# Patient Record
Sex: FEMALE | Race: BLACK OR AFRICAN AMERICAN | NOT HISPANIC OR LATINO | Employment: OTHER | URBAN - METROPOLITAN AREA
[De-identification: names, ages, dates, MRNs, and addresses within clinical notes are randomized per-mention and may not be internally consistent; named-entity substitution may affect disease eponyms.]

---

## 2020-06-05 ENCOUNTER — HOSPITAL ENCOUNTER (EMERGENCY)
Facility: CLINIC | Age: 85
Discharge: HOME | End: 2020-06-05
Attending: EMERGENCY MEDICINE | Admitting: EMERGENCY MEDICINE
Payer: COMMERCIAL

## 2020-06-05 ENCOUNTER — APPOINTMENT (OUTPATIENT)
Dept: GENERAL RADIOLOGY | Facility: CLINIC | Age: 85
End: 2020-06-05
Attending: EMERGENCY MEDICINE
Payer: COMMERCIAL

## 2020-06-05 ENCOUNTER — APPOINTMENT (OUTPATIENT)
Dept: CT IMAGING | Facility: CLINIC | Age: 85
End: 2020-06-05
Attending: EMERGENCY MEDICINE
Payer: COMMERCIAL

## 2020-06-05 VITALS
HEART RATE: 59 BPM | SYSTOLIC BLOOD PRESSURE: 129 MMHG | RESPIRATION RATE: 20 BRPM | OXYGEN SATURATION: 100 % | TEMPERATURE: 98 F | DIASTOLIC BLOOD PRESSURE: 69 MMHG

## 2020-06-05 DIAGNOSIS — J44.1 COPD EXACERBATION (H): ICD-10-CM

## 2020-06-05 DIAGNOSIS — R06.02 SHORTNESS OF BREATH: ICD-10-CM

## 2020-06-05 DIAGNOSIS — I44.7 LBBB (LEFT BUNDLE BRANCH BLOCK): ICD-10-CM

## 2020-06-05 LAB
ALBUMIN SERPL-MCNC: 3.4 G/DL (ref 3.4–5)
ALBUMIN UR-MCNC: NEGATIVE MG/DL
ALP SERPL-CCNC: 97 U/L (ref 40–150)
ALT SERPL W P-5'-P-CCNC: 20 U/L (ref 0–50)
ANION GAP SERPL CALCULATED.3IONS-SCNC: 5 MMOL/L (ref 3–14)
APPEARANCE UR: CLEAR
AST SERPL W P-5'-P-CCNC: 23 U/L (ref 0–45)
BASOPHILS # BLD AUTO: 0.1 10E9/L (ref 0–0.2)
BASOPHILS NFR BLD AUTO: 1.2 %
BILIRUB SERPL-MCNC: 0.8 MG/DL (ref 0.2–1.3)
BILIRUB UR QL STRIP: NEGATIVE
BUN SERPL-MCNC: 20 MG/DL (ref 7–30)
CALCIUM SERPL-MCNC: 10 MG/DL (ref 8.5–10.1)
CHLORIDE SERPL-SCNC: 107 MMOL/L (ref 94–109)
CO2 SERPL-SCNC: 25 MMOL/L (ref 20–32)
COLOR UR AUTO: NORMAL
CREAT SERPL-MCNC: 0.85 MG/DL (ref 0.52–1.04)
D DIMER PPP FEU-MCNC: 1.3 UG/ML FEU (ref 0–0.5)
DIFFERENTIAL METHOD BLD: ABNORMAL
EOSINOPHIL # BLD AUTO: 0.4 10E9/L (ref 0–0.7)
EOSINOPHIL NFR BLD AUTO: 5.6 %
ERYTHROCYTE [DISTWIDTH] IN BLOOD BY AUTOMATED COUNT: 16.3 % (ref 10–15)
GFR SERPL CREATININE-BSD FRML MDRD: 62 ML/MIN/{1.73_M2}
GLUCOSE SERPL-MCNC: 109 MG/DL (ref 70–99)
GLUCOSE UR STRIP-MCNC: NEGATIVE MG/DL
HCT VFR BLD AUTO: 42 % (ref 35–47)
HGB BLD-MCNC: 12.8 G/DL (ref 11.7–15.7)
HGB UR QL STRIP: NEGATIVE
IMM GRANULOCYTES # BLD: 0 10E9/L (ref 0–0.4)
IMM GRANULOCYTES NFR BLD: 0.2 %
KETONES UR STRIP-MCNC: NEGATIVE MG/DL
LACTATE BLD-SCNC: 0.7 MMOL/L (ref 0.7–2)
LEUKOCYTE ESTERASE UR QL STRIP: NEGATIVE
LIPASE SERPL-CCNC: 53 U/L (ref 73–393)
LYMPHOCYTES # BLD AUTO: 2.5 10E9/L (ref 0.8–5.3)
LYMPHOCYTES NFR BLD AUTO: 38.9 %
MCH RBC QN AUTO: 28.8 PG (ref 26.5–33)
MCHC RBC AUTO-ENTMCNC: 30.5 G/DL (ref 31.5–36.5)
MCV RBC AUTO: 94 FL (ref 78–100)
MONOCYTES # BLD AUTO: 0.7 10E9/L (ref 0–1.3)
MONOCYTES NFR BLD AUTO: 10.9 %
NEUTROPHILS # BLD AUTO: 2.8 10E9/L (ref 1.6–8.3)
NEUTROPHILS NFR BLD AUTO: 43.2 %
NITRATE UR QL: NEGATIVE
NRBC # BLD AUTO: 0 10*3/UL
NRBC BLD AUTO-RTO: 0 /100
PH UR STRIP: 5.5 PH (ref 5–7)
PLATELET # BLD AUTO: 215 10E9/L (ref 150–450)
POTASSIUM SERPL-SCNC: 4 MMOL/L (ref 3.4–5.3)
PROT SERPL-MCNC: 7.9 G/DL (ref 6.8–8.8)
RBC # BLD AUTO: 4.45 10E12/L (ref 3.8–5.2)
RBC #/AREA URNS AUTO: 1 /HPF (ref 0–2)
SODIUM SERPL-SCNC: 137 MMOL/L (ref 133–144)
SOURCE: NORMAL
SP GR UR STRIP: 1.01 (ref 1–1.03)
SQUAMOUS #/AREA URNS AUTO: 1 /HPF (ref 0–1)
TROPONIN I SERPL-MCNC: <0.015 UG/L (ref 0–0.04)
UROBILINOGEN UR STRIP-MCNC: NORMAL MG/DL (ref 0–2)
WBC # BLD AUTO: 6.5 10E9/L (ref 4–11)
WBC #/AREA URNS AUTO: 1 /HPF (ref 0–5)

## 2020-06-05 PROCEDURE — 36415 COLL VENOUS BLD VENIPUNCTURE: CPT | Performed by: EMERGENCY MEDICINE

## 2020-06-05 PROCEDURE — 81001 URINALYSIS AUTO W/SCOPE: CPT | Performed by: EMERGENCY MEDICINE

## 2020-06-05 PROCEDURE — 99285 EMERGENCY DEPT VISIT HI MDM: CPT | Mod: 25

## 2020-06-05 PROCEDURE — 84484 ASSAY OF TROPONIN QUANT: CPT | Performed by: EMERGENCY MEDICINE

## 2020-06-05 PROCEDURE — 80053 COMPREHEN METABOLIC PANEL: CPT | Performed by: EMERGENCY MEDICINE

## 2020-06-05 PROCEDURE — 96365 THER/PROPH/DIAG IV INF INIT: CPT | Mod: 59

## 2020-06-05 PROCEDURE — 25000132 ZZH RX MED GY IP 250 OP 250 PS 637: Performed by: EMERGENCY MEDICINE

## 2020-06-05 PROCEDURE — 96366 THER/PROPH/DIAG IV INF ADDON: CPT

## 2020-06-05 PROCEDURE — 25000125 ZZHC RX 250: Performed by: EMERGENCY MEDICINE

## 2020-06-05 PROCEDURE — 71045 X-RAY EXAM CHEST 1 VIEW: CPT

## 2020-06-05 PROCEDURE — 25000128 H RX IP 250 OP 636: Performed by: EMERGENCY MEDICINE

## 2020-06-05 PROCEDURE — 71275 CT ANGIOGRAPHY CHEST: CPT

## 2020-06-05 PROCEDURE — 96375 TX/PRO/DX INJ NEW DRUG ADDON: CPT | Mod: 59

## 2020-06-05 PROCEDURE — 94640 AIRWAY INHALATION TREATMENT: CPT

## 2020-06-05 PROCEDURE — 85379 FIBRIN DEGRADATION QUANT: CPT | Performed by: EMERGENCY MEDICINE

## 2020-06-05 PROCEDURE — 83690 ASSAY OF LIPASE: CPT | Performed by: EMERGENCY MEDICINE

## 2020-06-05 PROCEDURE — 93005 ELECTROCARDIOGRAM TRACING: CPT

## 2020-06-05 PROCEDURE — 87040 BLOOD CULTURE FOR BACTERIA: CPT | Performed by: EMERGENCY MEDICINE

## 2020-06-05 PROCEDURE — 85025 COMPLETE CBC W/AUTO DIFF WBC: CPT | Performed by: EMERGENCY MEDICINE

## 2020-06-05 PROCEDURE — 83605 ASSAY OF LACTIC ACID: CPT | Performed by: EMERGENCY MEDICINE

## 2020-06-05 RX ORDER — ALBUTEROL SULFATE 90 UG/1
2 AEROSOL, METERED RESPIRATORY (INHALATION)
Status: COMPLETED | OUTPATIENT
Start: 2020-06-05 | End: 2020-06-05

## 2020-06-05 RX ORDER — METHYLPREDNISOLONE SODIUM SUCCINATE 125 MG/2ML
60 INJECTION, POWDER, LYOPHILIZED, FOR SOLUTION INTRAMUSCULAR; INTRAVENOUS ONCE
Status: COMPLETED | OUTPATIENT
Start: 2020-06-05 | End: 2020-06-05

## 2020-06-05 RX ORDER — IOPAMIDOL 755 MG/ML
500 INJECTION, SOLUTION INTRAVASCULAR ONCE
Status: COMPLETED | OUTPATIENT
Start: 2020-06-05 | End: 2020-06-05

## 2020-06-05 RX ORDER — ALBUTEROL SULFATE 90 UG/1
6 AEROSOL, METERED RESPIRATORY (INHALATION) ONCE
Status: DISCONTINUED | OUTPATIENT
Start: 2020-06-05 | End: 2020-06-05

## 2020-06-05 RX ORDER — LIDOCAINE 40 MG/G
CREAM TOPICAL
Status: DISCONTINUED | OUTPATIENT
Start: 2020-06-05 | End: 2020-06-05 | Stop reason: HOSPADM

## 2020-06-05 RX ORDER — ALBUTEROL SULFATE 90 UG/1
2 AEROSOL, METERED RESPIRATORY (INHALATION) EVERY 6 HOURS PRN
Qty: 1 INHALER | Refills: 0 | Status: SHIPPED | OUTPATIENT
Start: 2020-06-05 | End: 2021-11-29

## 2020-06-05 RX ORDER — PREDNISONE 20 MG/1
TABLET ORAL
Qty: 10 TABLET | Refills: 0 | Status: SHIPPED | OUTPATIENT
Start: 2020-06-05 | End: 2020-11-30

## 2020-06-05 RX ORDER — MAGNESIUM SULFATE HEPTAHYDRATE 40 MG/ML
2 INJECTION, SOLUTION INTRAVENOUS ONCE
Status: DISCONTINUED | OUTPATIENT
Start: 2020-06-05 | End: 2020-06-05

## 2020-06-05 RX ORDER — MAGNESIUM SULFATE HEPTAHYDRATE 40 MG/ML
2 INJECTION, SOLUTION INTRAVENOUS ONCE
Status: COMPLETED | OUTPATIENT
Start: 2020-06-05 | End: 2020-06-05

## 2020-06-05 RX ADMIN — ALBUTEROL SULFATE 2 PUFF: 90 AEROSOL, METERED RESPIRATORY (INHALATION) at 16:20

## 2020-06-05 RX ADMIN — ALBUTEROL SULFATE 2 PUFF: 90 AEROSOL, METERED RESPIRATORY (INHALATION) at 16:09

## 2020-06-05 RX ADMIN — ALBUTEROL SULFATE 2 PUFF: 90 AEROSOL, METERED RESPIRATORY (INHALATION) at 16:31

## 2020-06-05 RX ADMIN — IOPAMIDOL 67 ML: 755 INJECTION, SOLUTION INTRAVENOUS at 17:21

## 2020-06-05 RX ADMIN — SODIUM CHLORIDE 73 ML: 9 INJECTION, SOLUTION INTRAVENOUS at 17:21

## 2020-06-05 RX ADMIN — MAGNESIUM SULFATE IN WATER 2 G: 40 INJECTION, SOLUTION INTRAVENOUS at 16:23

## 2020-06-05 RX ADMIN — METHYLPREDNISOLONE SODIUM SUCCINATE 62.5 MG: 125 INJECTION, POWDER, FOR SOLUTION INTRAMUSCULAR; INTRAVENOUS at 16:15

## 2020-06-05 NOTE — ED AVS SNAPSHOT
[facility] Emergency Department  [address]  [facility] [address]  Phone:  [phone]  Fax:  [phone]                                    Judit De Dios   MRN: [id number]    Department:  [facility] Emergency Department   Date of Visit:  6/5/2020           After Visit Summary Signature Page    I have received my discharge instructions, and my questions have been answered. I have discussed any challenges I see with this plan with the nurse or doctor.    ..........................................................................................................................................  Patient/Patient Representative Signature      ..........................................................................................................................................  Patient Representative Print Name and Relationship to Patient    ..................................................               ................................................  Date                                   Time    ..........................................................................................................................................  Reviewed by Signature/Title    ...................................................              ..............................................  Date                                               Time          22EPIC Rev 08/18

## 2020-06-05 NOTE — ED TRIAGE NOTES
Presents with cough, states she had pneumonia in February and hasn't gotten rid of the cough since. Also complains of chronic bilateral knee and lower back pain. A&O x 4 with VSS on RA.

## 2020-06-05 NOTE — ED PROVIDER NOTES
History     Chief Complaint:  Cough       HPI   Judit De Dios is a 86 year old female with a history of hypertension and diabetes, as well as probable COPDwho presents with shortness of breath which has gotten worse this week.  She admits to a cough which has been relatively non-productive.  She states that she has been sick since February when she was diagnosed with pneumonia and that she feels like her symptoms never fully resolved.  She denies any new fever of recent.  She denies any chest pain.  She does not know if she is ever had left bundle branch block in the past she and cannot recall if she has had any EKGs done anywhere that would substantiate this except for in [place].    Allergies:   No Known Allergies     Medications:    Amlodipine   Baclofen   Gabapentin   Oxybutynin chloride   Valsartan   Omeprazole  Metformin   hydrochlorothiazide  Captopril      Past Medical History:    Arthritis   Diabetes   Hypertension     Past Surgical History:    Hand surgery   Nose surgery   Hysterectomy     Family History:    Family history reviewed. No pertinent family history.       Social History:  Smoking Status: Never Smoker  Smokeless Tobacco: Never Used  Alcohol Use: No  Drug Use: No    Review of Systems  See HPI all other systems negative    Physical Exam     Patient Vitals for the past 24 hrs:   BP Temp Temp src Pulse Heart Rate Resp SpO2   06/05/20 1412 129/69 -- -- -- -- -- --   06/05/20 1409 -- 98  F (36.7  C) Oral 59 59 17 100 %        Physical Exam  GEN:  alert, thin of stature    HEAD: atraumatic    EYES: pupils reactive, extraocular muscles intact, conjunctivae normal    ENT: TMs normal as are EACs; nares patent; normal posterior pharynx and oral mucosa    NECK: no posterior midline tenderness, no meningeal signs, trachea midline     RESPIRATORY: no tachypnea, breath sounds diminished throughout    CVS: normal S1/S2, no murmurs/rubs/gallops    ABDOMEN: soft, nontender, no masses or organomegaly, no  rebound, positive bowel sounds    MUSCULOSKELETAL: no deformities    SKIN: warm and dry, no acute rashes or ulceration, no erythema     NEURO: GCS 15, cranial nerves intact.  Motor and sensory- good tone; normal gait and coordination    LYMPH: no lymphadenopathy    PSYCHE: Normal affect and mood      Emergency Department Course   ECG:  ECG taken at 1528  Sinus rhythm  Left axis deviation  Left bundle branch block  Abnormal ECG  Vent. rate 60 BPM  MT interval 184 ms  QRS duration 150 ms  QT/QTc 428/428 ms  P-R-T axes 52 -35 107    Imaging:  Radiology findings were communicated with the patient who voiced understanding of the findings.    CT Chest Pulmonary Embolism w Contrast  Final Result  IMPRESSION:  1.  No pulmonary embolism.  2.  Benign myelolipoma left adrenal gland.  3.  Segmental elevation of the right hemidiaphragm.  Reading per radiology       XR Chest Port 1 View  Final Result  IMPRESSION: Single portable AP view of the chest was obtained. Low  lung volume with persistent mild asymmetric elevation of the right  hemidiaphragm as compared to the left. Stable cardiomediastinal  silhouette. No suspicious focal pulmonary opacities. No significant  pleural effusion or pneumothorax.  CHRIS PASTRANA MD  Reading per radiology       Laboratory:  Laboratory findings were communicated with the patient who voiced understanding of the findings.    UA with microscopic: WNL     CBC:  WBC 6.5, HGB 12.8, , o/w WNL     D dimer quantitative: 1.3 (H)    CMP: Glucose 109 (H), o/w WNL (Creatinine: 0.85)       Lipase: 53 (L)      Lactic Acid whole blood (1504): 0.7      Troponin(1504):  <0.015      Blood Culture x2: Pending     Interventions:  1609 Albuterol 2 puff inhalation   1615 Solu-medrol 62.5 mg IV  1620 Albuterol 2 puff inhalation  1623 Magnesium sulfate 2 g IV   1631 Albuterol 2 puff inhalation     Emergency Department Course:  Past medical records, nursing notes, and vitals reviewed.    1437 I performed an  exam of the patient as documented above.    IV was inserted and blood was drawn for laboratory testing, results above.     The patient provided a urine sample here in the emergency department. This was sent for laboratory testing, findings above.     The patient was sent for imaging while in the emergency department, results above.       1920 Patient rechecked and updated.       Findings and plan explained to the Patient. Patient discharged home with instructions regarding supportive care, medications, and reasons to return. The importance of close follow-up was reviewed. The patient was prescribed albuterol inhaler and prednisone.       Impression & Plan       Medical Decision Making:  Judit De Dios is a 86 year old female who presents to the emergency department today with shortness of breath without any evidence of pneumonia, CHF, pneumothorax, nor pulmonary embolism.  She does have findings on examination that suggest COPD and acute exacerbation.  She improved after bronchodilators and steroids.  She does have a pro-air MDI but no spacer.  We obtained one for her and the nurse demonstrated proper usage.    Concern however, is the age of the left of the branch block.  I did try to contact the hospital in [place]  and presumably because it is a weekend got no response.  Although she is really not having what appears to be ischemic chest pain and appears to be more consistent with shortness of breath, I am still concerned about whether or not this could be new left bundle branch block that could suggest an ischemic event.  Her troponin is normal here today however I have recommended to her that we admit her to observation, continue bronchodilator treatment with steroids, trend her troponins, and attempt to track down an old EKG.  She adamantly refused this plan and was wanting to go home.  I did discuss with her the risks of morbidity or even mortality if she were to go home prior to ascertaining whether or  not this is a new left bundle branch block.  I do feel that she has the capacity to understand the risks involved that we have discussed.  The next best alternative is to follow-up with her PCP on Monday and to return to the emergency department if she develops worsening or new symptoms.    She is going home however on a prednisone burst and continued bronchodilator treatments as discussed using spacer.      Discharge Diagnosis:    ICD-10-CM    1. Shortness of breath  R06.02    2. LBBB (left bundle branch block)  I44.7    3. COPD exacerbation (H)  J44.1        Disposition:  Left against Medical Advice    Discharge Medications:  New Prescriptions    ALBUTEROL (PROAIR HFA) 108 (90 BASE) MCG/ACT INHALER    Inhale 2 puffs into the lungs every 6 hours as needed for shortness of breath / dyspnea or wheezing    PREDNISONE (DELTASONE) 20 MG TABLET    Take two tablets (= 40mg) each day for 5 (five) days       Scribe Disclosure:  IJey, am serving as a scribe at 2:37 PM on 6/5/2020 to document services personally performed by Jr Mccarthy MD based on my observations and the provider's statements to me.      6/5/2020   Jr Mccarthy MD Egger, Thomas W, MD  06/09/20 0858

## 2020-06-05 NOTE — ED NOTES
Patient given 2 doses of two puffs of albuterol inhaler. Patient used spacer with albuterol inhalation. Educated patient on proper timing and administration of inhaler. Patient reports teeth chattering and shakiness after second dose of albuterol inhaler. Denies having this symptom at home with albuterol. Reassured patient. Continues to require reassurance.

## 2020-06-06 NOTE — ED NOTES
This RN discussed necessity of seeking Emergency Medical treatment if symptoms return. Patient verbalized understanding.

## 2020-06-06 NOTE — DISCHARGE INSTRUCTIONS
YOU HAVE WHAT LOOKS LIKE A NEW LBBB PATTERN ON YOUR EKG WHICH IF IT IS NEW, SUGGESTS YOU MAY HAVE HAD A HEART ATTACK WHICH HAS CAUSED YOUR SYMPTOMS WHICH LED YOU TO SEEK RELIEF AT THE EMERGENCY DEPARTMENT TODAY.  YOU HAVE CHOSE TO BE DISCHARGED FROM THE HOSPITAL INSTEAD OF UNDERGOING FURTHER EVALUATION AND TREATMENT WHICH COULD CAUSE YOU TO INCUR IRREVERSIBLE INJURY OR DEATH.  THE NEXT BEST OPTION THEREFORE IS CLOSE FOLLOW UP WITH YOUR DOCTOR ON Monday AND TO RETURN HERE IF YOU CHANGE YOUR MIND OR DEVELOP WORSENING SYMPTOMS IN THE INTERIM.

## 2020-06-08 LAB — INTERPRETATION ECG - MUSE: NORMAL

## 2020-06-11 LAB
BACTERIA SPEC CULT: NO GROWTH
BACTERIA SPEC CULT: NO GROWTH
Lab: NORMAL
SPECIMEN SOURCE: NORMAL
SPECIMEN SOURCE: NORMAL

## 2020-11-30 ENCOUNTER — APPOINTMENT (OUTPATIENT)
Dept: GENERAL RADIOLOGY | Facility: CLINIC | Age: 85
End: 2020-11-30
Attending: PHYSICIAN ASSISTANT
Payer: COMMERCIAL

## 2020-11-30 ENCOUNTER — HOSPITAL ENCOUNTER (EMERGENCY)
Facility: CLINIC | Age: 85
Discharge: HOME | End: 2020-11-30
Attending: PHYSICIAN ASSISTANT | Admitting: PHYSICIAN ASSISTANT
Payer: COMMERCIAL

## 2020-11-30 VITALS
TEMPERATURE: 97.2 F | RESPIRATION RATE: 16 BRPM | HEART RATE: 56 BPM | SYSTOLIC BLOOD PRESSURE: 139 MMHG | OXYGEN SATURATION: 91 % | DIASTOLIC BLOOD PRESSURE: 73 MMHG

## 2020-11-30 DIAGNOSIS — R06.02 SHORTNESS OF BREATH: ICD-10-CM

## 2020-11-30 DIAGNOSIS — I44.7 LBBB (LEFT BUNDLE BRANCH BLOCK): ICD-10-CM

## 2020-11-30 DIAGNOSIS — R05.9 COUGH: ICD-10-CM

## 2020-11-30 LAB
ANION GAP SERPL CALCULATED.3IONS-SCNC: 1 MMOL/L (ref 3–14)
BASOPHILS # BLD AUTO: 0.1 10E9/L (ref 0–0.2)
BASOPHILS NFR BLD AUTO: 1 %
BUN SERPL-MCNC: 22 MG/DL (ref 7–30)
CALCIUM SERPL-MCNC: 9.6 MG/DL (ref 8.5–10.1)
CHLORIDE SERPL-SCNC: 108 MMOL/L (ref 94–109)
CO2 SERPL-SCNC: 31 MMOL/L (ref 20–32)
CREAT SERPL-MCNC: 0.95 MG/DL (ref 0.52–1.04)
DIFFERENTIAL METHOD BLD: NORMAL
EOSINOPHIL # BLD AUTO: 0.2 10E9/L (ref 0–0.7)
EOSINOPHIL NFR BLD AUTO: 4.5 %
ERYTHROCYTE [DISTWIDTH] IN BLOOD BY AUTOMATED COUNT: 13.4 % (ref 10–15)
GFR SERPL CREATININE-BSD FRML MDRD: 54 ML/MIN/{1.73_M2}
GLUCOSE SERPL-MCNC: 114 MG/DL (ref 70–99)
HCT VFR BLD AUTO: 39.7 % (ref 35–47)
HGB BLD-MCNC: 12.6 G/DL (ref 11.7–15.7)
IMM GRANULOCYTES # BLD: 0 10E9/L (ref 0–0.4)
IMM GRANULOCYTES NFR BLD: 0.2 %
LYMPHOCYTES # BLD AUTO: 2.1 10E9/L (ref 0.8–5.3)
LYMPHOCYTES NFR BLD AUTO: 40.7 %
MCH RBC QN AUTO: 31 PG (ref 26.5–33)
MCHC RBC AUTO-ENTMCNC: 31.7 G/DL (ref 31.5–36.5)
MCV RBC AUTO: 98 FL (ref 78–100)
MONOCYTES # BLD AUTO: 0.7 10E9/L (ref 0–1.3)
MONOCYTES NFR BLD AUTO: 13.5 %
NEUTROPHILS # BLD AUTO: 2.1 10E9/L (ref 1.6–8.3)
NEUTROPHILS NFR BLD AUTO: 40.1 %
NRBC # BLD AUTO: 0 10*3/UL
NRBC BLD AUTO-RTO: 0 /100
PLATELET # BLD AUTO: 153 10E9/L (ref 150–450)
POTASSIUM SERPL-SCNC: 3.8 MMOL/L (ref 3.4–5.3)
RBC # BLD AUTO: 4.07 10E12/L (ref 3.8–5.2)
SODIUM SERPL-SCNC: 140 MMOL/L (ref 133–144)
TROPONIN I SERPL-MCNC: <0.015 UG/L (ref 0–0.04)
WBC # BLD AUTO: 5.1 10E9/L (ref 4–11)

## 2020-11-30 PROCEDURE — 84484 ASSAY OF TROPONIN QUANT: CPT | Performed by: PHYSICIAN ASSISTANT

## 2020-11-30 PROCEDURE — 99285 EMERGENCY DEPT VISIT HI MDM: CPT | Mod: 25

## 2020-11-30 PROCEDURE — 80048 BASIC METABOLIC PNL TOTAL CA: CPT | Performed by: PHYSICIAN ASSISTANT

## 2020-11-30 PROCEDURE — 85025 COMPLETE CBC W/AUTO DIFF WBC: CPT | Performed by: PHYSICIAN ASSISTANT

## 2020-11-30 PROCEDURE — 71045 X-RAY EXAM CHEST 1 VIEW: CPT

## 2020-11-30 PROCEDURE — 93005 ELECTROCARDIOGRAM TRACING: CPT

## 2020-11-30 RX ORDER — BENZONATATE 100 MG/1
100 CAPSULE ORAL 3 TIMES DAILY PRN
Qty: 15 CAPSULE | Refills: 0 | Status: SHIPPED | OUTPATIENT
Start: 2020-11-30 | End: 2020-12-05

## 2020-11-30 RX ORDER — PREDNISONE 20 MG/1
TABLET ORAL
Qty: 10 TABLET | Refills: 0 | Status: SHIPPED | OUTPATIENT
Start: 2020-11-30 | End: 2021-11-29

## 2020-11-30 ASSESSMENT — ENCOUNTER SYMPTOMS
DIARRHEA: 0
SORE THROAT: 0
NAUSEA: 0
COUGH: 1
SHORTNESS OF BREATH: 1
VOMITING: 0
FEVER: 0

## 2020-11-30 NOTE — ED AVS SNAPSHOT
[facility] Emergency Dept  [address]  [facility] [address]  Phone: [phone]  Fax: [phone]                                    Judit De Dios   MRN: [id number]    Department: [facility] Emergency Dept   Date of Visit: 11/30/2020           After Visit Summary Signature Page    I have received my discharge instructions, and my questions have been answered. I have discussed any challenges I see with this plan with the nurse or doctor.    ..........................................................................................................................................  Patient/Patient Representative Signature      ..........................................................................................................................................  Patient Representative Print Name and Relationship to Patient    ..................................................               ................................................  Date                                   Time    ..........................................................................................................................................  Reviewed by Signature/Title    ...................................................              ..............................................  Date                                               Time          22EPIC Rev 08/18

## 2020-11-30 NOTE — ED PROVIDER NOTES
History   Chief Complaint:  Shortness of Breath and Cough     The history is provided by the patient.      Judit De Dios is a 86 year old female with history of COPD, diabetes mellitus, and hypertension who presents with shortness of breath and cough. She reports a history of COPD and was treated for an acute on chronic exacerbation one month ago with some relief of her shortness of breath. Since that time, she has had persistent shortness of breath and cough that has worsened over the past two weeks. No associated chest pain, sore throat, fever, nausea, vomiting, or diarrhea.     Allergies:  No Known Allergies    Medications:   Albuterol inhaler  Amlodipine  Aspirin   Gabapentin  Norco  Oxybutynin  Valsartan     Past Medical History:    Arthritis   Diabetes mellitus  Hypertension   COPD    Past Surgical History:    Hand surgery   Nose surgery   Hysterectomy      Family History:    No past pertinent family history.    Social History:  The patient was unaccompanied to the ED.  Alcohol Use: No    Review of Systems   Constitutional: Negative for fever.   HENT: Negative for sore throat.    Respiratory: Positive for cough and shortness of breath.    Cardiovascular: Negative for chest pain.   Gastrointestinal: Negative for diarrhea, nausea and vomiting.   All other systems reviewed and are negative.    Physical Exam     Patient Vitals for the past 24 hrs:   BP Temp Pulse Resp SpO2   11/30/20 1430 135/65 -- 67 -- 98 %   11/30/20 1415 117/63 -- 60 -- 98 %   11/30/20 1400 131/84 -- 59 -- 98 %   11/30/20 1345 133/71 -- 69 -- 98 %   11/30/20 1330 134/63 -- 58 -- 99 %   11/30/20 1325 (!) 129/92 -- 67 -- 100 %   11/30/20 1148 (!) 140/82 97.2  F (36.2  C) 63 20 99 %       Physical Exam  General: Alert and interactive. Appears well. Cooperative and pleasant.   Eyes: The pupils are equal and round. EOMs intact. No scleral icterus.  ENT: No abnormalities to the external nose or ears. Mucous membranes moist. Posterior  oropharynx is non-erythematous.      Neck: Trachea is in the midline. No nuchal rigidity.     CV: Soft systolic murmur. S1 and S2 normal without murmur, click, gallop or rub.   Resp: Breath sounds are clear bilaterally, without rhonchi, wheezes, rales. Non-labored, no retractions or accessory muscle use.     GI: Abdomen is soft without distension. No tenderness to palpation. No peritoneal signs.    MS: Moving all extremities well. Good muscle tone.   Skin: Warm and dry. No rash or lesions noted.  Neuro: Alert and oriented x 3. No focal neurologic deficits. Good strength and sensation in upper and lower extremities.    Psych: Awake. Alert.  Normal affect. Appropriate interactions.  Lymph: No anterior or posterior cervical lymphadenopathy noted.    Emergency Department Course   ECG:  ECG taken at 1345, ECG read at 1349 by Dr. Reed  Sinus bradycardia with sinus arrhythmia  Left axis deviation  Left bundle branch block  Abnormal ECG  Rate 59 bpm. WY interval 204 ms. QRS duration 154 ms. QT/QTc 438/433 ms. P-R-T axes 62 -35 104.    Imaging:  Radiology findings were communicated with the patient who voiced understanding of the findings.    XR Chest Port 1 View  IMPRESSION: Hypoinflated lungs. No acute cardiopulmonary disease.   Reading per radiology     Laboratory:  Laboratory findings were communicated with the patient who voiced understanding of the findings.    CBC: WBC 5.1, HGB 12.6,   BMP: anion gap 1(L), glucose 114(H), GFR estimate 54(L) o/w WNL (Creatinine 0.95)  Troponin (Collected 1400): <0.015     Emergency Department Course:  Nursing notes and vitals reviewed.    1330 I performed an exam of the patient as documented above.     1440 I rechecked the patient. Explained findings to the Patient.    Findings and plan explained to the Patient. Patient discharged home with instructions regarding supportive care, medications, and reasons to return. The importance of close follow-up was reviewed. The patient  was prescribed Prednisone and Tessalon.      Impression & Plan      Medical Decision Making:  Judit De Dios is a 86 year old female who presents to the emergency department today for evaluation of cough and increased shortness of breath. She is well appearing, non-toxic, afebrile. Labs are reassuring. EKG is non-ischemic and troponin is negative, making ACS less likely. Symptoms highly inconsistent with PE, no hemoptysis. Symptoms seem infectious in etiology. LBBB is chronic. No new chest pains. CXR reveals no focal infiltrate. Will send patient home with tesslaon, prednisone, and have her follow closely with PCP should symptoms persist. Discussed side effects of Prednisone, including hyperglycemia, and I have requested that she have repeat glucose obtained at end of week. Patient stable for discharge home. Return with worsening symptoms.      Diagnosis:    ICD-10-CM    1. Cough  R05    2. Shortness of breath  R06.02    3. LBBB (left bundle branch block)  I44.7     Chronic       Disposition:   discharged to home    Discharge Medications:  New Prescriptions    BENZONATATE (TESSALON) 100 MG CAPSULE    Take 1 capsule (100 mg) by mouth 3 times daily as needed for cough    PREDNISONE (DELTASONE) 20 MG TABLET    Take two tablets (= 40mg) each day for 5 (five) days       Scribe Disclosure:  I, Jessica Roland, am serving as a scribe at 1:11 PM on 11/30/2020 to document services personally performed by Afsaneh Cleary PA-C based on my observations and the provider's statements to me.   [facility] EMERGENCY DEPARTMENT       Afsaneh Cleary PA-C  11/30/20 7919

## 2020-11-30 NOTE — DISCHARGE INSTRUCTIONS
Rest at home.   Take Ibuprofen/Tylenol if body aches.   Use Prednisone for 5 days and then Tessalon for coughing.   Follow up with primary care to recheck symptoms and have them check glucose.     Discharge Instructions  Upper Respiratory Infection    The upper respiratory tract includes the sinuses, nasal passages, pharynx, and larynx. A URI, or upper respiratory infection, is an infection of any of the parts of the upper airway. Symptoms include runny nose, congestion, sneezing, sore throat, cough, and fever. URIs are almost always caused by a virus. Antibiotics do not help with viral infections, so are generally not prescribed. A URI is very contagious through coughing and nasal secretions; make sure you wash your hands often and clean surfaces after sneezing, coughing or touching them. While you should start to improve in 3 - 5 days, remember that sometimes a cough can linger for several weeks.    Generally, every Emergency Department visit should have a follow-up clinic visit with either a primary or a specialty clinic/provider. Please follow-up as instructed by your emergency provider today.    Return to the Emergency Department if:  Any of your symptoms get much worse.  You seem very sick, like being too weak to get up.  You have chest pain or shortness of breath.   You have a severe headache.  You are vomiting (throwing up) so much you cannot keep fluids or medicines down.  You have confusion or seem unusually drowsy.  You have a seizure.    What can I do to help myself?  Fill any prescriptions the provider gave you and take them right away  If you have a fever, get plenty of rest and drink lots of fluids, especially water.  Using a humidifier or saline nose spray will also help loosen mucous.   Clothes or blankets will not change your fever. Do what is comfortable for you.  Bathing or sponging in lukewarm water may help you feel better.  Acetaminophen (Tylenol ) or ibuprofen (Advil , Motrin ) will help  bring fever down and may help you feel more comfortable. Be sure to read and follow the package directions, and ask your provider if you have questions.  Do not drink alcohol.  Decongestants may help you feel better. You may use decongestant nose sprays Afrin  (oxymetazoline) or Jp-Synephrine  (phenylephrine hydrochloride) for up to 3 days, or may use a decongestant tablet like Sudafed  (pseudoephedrine).  If you were given a prescription for medicine here today, be sure to read all of the information (including the package insert) that comes with your prescription.  This will include important information about the medicine, its side effects, and any warnings that you need to know about.  The pharmacist who fills the prescription can provide more information and answer questions you may have about the medicine.  If you have questions or concerns that the pharmacist cannot address, please call or return to the Emergency Department.   Remember that you can always come back to the Emergency Department if you are not able to see your regular provider in the amount of time listed above, if you get any new symptoms, or if there is anything that worries you.

## 2020-11-30 NOTE — ED TRIAGE NOTES
Cough x 2 weeks and SOB. Patient states she has a history of COPD. ABC intact alert and no distress.Patient states she has had toe and finger numbness for years. ABC intact alert and no distress.

## 2020-12-01 LAB — INTERPRETATION ECG - MUSE: NORMAL

## 2021-05-10 ENCOUNTER — HOSPITAL ENCOUNTER (EMERGENCY)
Facility: CLINIC | Age: 86
Discharge: HOME | End: 2021-05-10
Attending: PHYSICIAN ASSISTANT | Admitting: PHYSICIAN ASSISTANT
Payer: COMMERCIAL

## 2021-05-10 ENCOUNTER — APPOINTMENT (OUTPATIENT)
Dept: ULTRASOUND IMAGING | Facility: CLINIC | Age: 86
End: 2021-05-10
Attending: PHYSICIAN ASSISTANT
Payer: COMMERCIAL

## 2021-05-10 VITALS
OXYGEN SATURATION: 99 % | HEART RATE: 70 BPM | SYSTOLIC BLOOD PRESSURE: 142 MMHG | TEMPERATURE: 98.1 F | DIASTOLIC BLOOD PRESSURE: 73 MMHG | RESPIRATION RATE: 18 BRPM

## 2021-05-10 DIAGNOSIS — M25.561 BILATERAL CHRONIC KNEE PAIN: ICD-10-CM

## 2021-05-10 DIAGNOSIS — M25.562 BILATERAL CHRONIC KNEE PAIN: ICD-10-CM

## 2021-05-10 DIAGNOSIS — M71.22 BAKER'S CYST OF KNEE, LEFT: ICD-10-CM

## 2021-05-10 DIAGNOSIS — M17.0 PRIMARY OSTEOARTHRITIS OF BOTH KNEES: ICD-10-CM

## 2021-05-10 DIAGNOSIS — G89.29 BILATERAL CHRONIC KNEE PAIN: ICD-10-CM

## 2021-05-10 PROCEDURE — 250N000013 HC RX MED GY IP 250 OP 250 PS 637: Performed by: PHYSICIAN ASSISTANT

## 2021-05-10 PROCEDURE — 93970 EXTREMITY STUDY: CPT

## 2021-05-10 PROCEDURE — 99284 EMERGENCY DEPT VISIT MOD MDM: CPT | Mod: 25

## 2021-05-10 RX ORDER — ACETAMINOPHEN 325 MG/1
650 TABLET ORAL ONCE
Status: COMPLETED | OUTPATIENT
Start: 2021-05-10 | End: 2021-05-10

## 2021-05-10 RX ADMIN — ACETAMINOPHEN 650 MG: 325 TABLET, FILM COATED ORAL at 14:24

## 2021-05-10 ASSESSMENT — ENCOUNTER SYMPTOMS
ARTHRALGIAS: 1
HEADACHES: 1
FEVER: 0
CHILLS: 0
SHORTNESS OF BREATH: 0
COUGH: 0
NUMBNESS: 0
JOINT SWELLING: 1

## 2021-05-10 NOTE — ED TRIAGE NOTES
Patient presents to the ED with bilateral knee pain, left worse than right.  has been having pain and swelling x 2 weeks. Was seen at urgent care 1 week ago and got an injection. States helped for a few days, but pain has returned and swelling is getting worse.

## 2021-05-10 NOTE — ED NOTES
Pt up to bedside commode with assist of one, Pt denies any increasing weakness but reports pain with movement to the left knee

## 2021-05-10 NOTE — ED PROVIDER NOTES
"  History   Chief Complaint:  Knee Pain     The history is provided by the patient and a relative.      Judit De Dios is a 87 year old female with history of arthritis, LBBB, hypertension, COPD, hysterectomy, and type 2 diabetes who presents with knee pain. The patient has been having bilateral knee pain with associated swelling for \"years\"; at this time the patient was able to ambulate but with difficulty. She visited [facility] urgent care about a week and a half ago (4/29/2021) and received a cortisone shot, which seemed to provide relief for a couple of days. However for the past 2-3 days the patient's pain increased prompting her to present. Patient denied any new trauma.    Here in the ED the patient states that both knees are in pain, but the left is worse than the right, and the back of her left knee hurts the most. She also endorses a headache, is hungry because she hasn't eaten anything yet today. She denies fever, chills, chest pain, and shortness of breath. No numbness of tingling in lower extremities.     4/29 [facility]:  IMAGING: Right knee x-ray taken today reviewed by me personally reveals severe tricompartmental degenerative joint disease. Left knee taken yesterday at different facility also shows severe degenerative joint disease, tricompartmental. No fractures. No dislocations noted.      Review of Systems   Constitutional: Negative for chills and fever.   Respiratory: Negative for cough and shortness of breath.    Musculoskeletal: Positive for arthralgias (knee pain) and joint swelling (knee).   Neurological: Positive for headaches (Mild). Negative for numbness.   All other systems reviewed and are negative.    Allergies:  No known drug allergies    Medications:  Albuterol  Aspirin 81 mg  Amlodipine  Baclofen  Colace  Gabapentin  Oxybutynin  Deltasone  Valsartan  Tramadol  Cozaar  Zocor  Januvia  Detrolla  Oretic  Levofloxacin  Protonix  Ellipta  Valsartan  Prilosec    Past " Medical History:    Type 2 Diabetes  Pseudophakia of Both Eyes  Presbyopia  COPD  Hypertension  Arthritis  Hearing Loss  LBBB    Past Surgical History:    Septoplasty  Hand Surgery  Hysterectomy    Social History:  Arrives with son  Marital Status: Single  No tobacco use    Physical Exam     Patient Vitals for the past 24 hrs:   BP Temp Pulse Resp SpO2   05/10/21 1126 (!) 142/73 98.1  F (36.7  C) 70 18 99 %       Physical Exam  Vitals signs and nursing note reviewed.   HENT:      Nose: Nose normal. No congestion or rhinorrhea.      Mouth/Throat:      Mouth: Mucous membranes are moist.   Eyes:      General: No scleral icterus.     Extraocular Movements: Extraocular movements intact.      Conjunctiva/sclera: Conjunctivae normal.   Cardiovascular:   Pulses regular and symmetric in bilateral lower extremities.  Pulmonary:      Effort: Pulmonary effort is normal.      Breath sounds: Normal breath sounds.   Musculoskeletal: Pain with palpation to the posterior aspect of left knee. Soft compartments throughout bilateral lower extremities.  Able to move toes bilaterally.  Crepitus with extension of knees bilaterally.  No overt pain with palpation over patella bilaterally.  No reproducible tenderness with palpation to knee.  Skin:     General: Skin is warm and dry.  No lesions or open areas on exposed skin.  No erythema or warmth of the knees.  Neurological:      Mental Status: Alert.  Speech normal.  Psychiatric:         Mood and Affect: Mood normal.         Behavior: Behavior normal.     Emergency Department Course   Imaging:  US Lower extremity Venous duplex bilateral  IMPRESSION:   1. Negative for deep venous thrombosis in both lower extremities.   2. Heterogeneous hypoechoic collection left popliteal fossa is 4.8 x 3   x 1.9 cm. This may be a complex Baker's cyst.  Reading per radiology    Emergency Department Course:    Reviewed:  I reviewed nursing notes, vitals, past medical history and care  everywhere    Assessments:  1158 I obtained history and examined the patient as noted above.   1311 Patient rechecked  1418 I rechecked the patient and explained findings.   1420 Patient set for discharge.    Interventions:  1424 Tylenol 650 mg    Disposition:  The patient was discharged to home.     Impression & Plan   Medical Decision Making:  Judit De Dios is a 87 year old female  who presents for evaluation of bilateral knee pain.  Those were reviewed.  Patient afebrile and hemodynamically stable.  Her exam findings are noted above, most pertinent is no redness, warmth to knee making septic arthritis and/or crystal disease of joint very unlikely.  Signs and symptoms are consistent with chronic knee pain and primary osteoarthritis in both knees.  Patient denied any new trauma to her knees or falls therefore x-rays were deferred at this time.  Bilateral lower extremity ultrasound was performed and was negative for deep vein thrombosis.  There was evidence of a hypoechoic collection left popliteal fossa concerning for a Baker's cyst.  Above discussed with the patient and her son.  Both verbalized understanding.  The patient was advised to follow-up with an orthopedic specialist in 1 to 2 days for reassessment and for definitive management.  Pain management discussed. The patients head to toe trauma exam is otherwise negative for serious underlying disease of the head, neck, chest, abdomen, extremities, pelvis.  Strict return precautions were discussed.  All questions and concerns were addressed prior to discharge.    Covid-19  Judit De Dios was evaluated during a global COVID-19 pandemic, which necessitated consideration that the patient might be at risk for infection with the SARS-CoV-2 virus that causes COVID-19.   Applicable protocols for evaluation were followed during the patient's care.   COVID-19 was considered as part of the patient's evaluation.     Diagnosis:    ICD-10-CM    1. Bilateral chronic  knee pain  M25.561     M25.562     G89.29    2. Primary osteoarthritis of both knees  M17.0    3. Baker's cyst of knee, left  M71.22        Scribe Disclosure:  I, Coco Marx, am serving as a scribe at 12:12 PM on 5/10/2021 to document services personally performed by Grecia Guaman PA-C based on my observations and the provider's statements to me.      Grecia Guaman PA-C  05/10/21 1615       Grecia Guaman PA-C  05/10/21 1618

## 2021-05-10 NOTE — DISCHARGE INSTRUCTIONS
Review attached instructions.  As discussed, your bilateral lower extremity ultrasounds were negative for deep vein thrombosis.  Please follow-up with [facility] orthopedic specialist in 1 day for reassessment.  Return to the emergency department if you develop fever, worsening discomfort, new trauma to the knees, or any other medical concern.

## 2021-11-29 ENCOUNTER — HOSPITAL ENCOUNTER (EMERGENCY)
Facility: CLINIC | Age: 86
Discharge: HOME | End: 2021-11-29
Attending: EMERGENCY MEDICINE | Admitting: EMERGENCY MEDICINE
Payer: COMMERCIAL

## 2021-11-29 VITALS
TEMPERATURE: 98.8 F | HEART RATE: 77 BPM | RESPIRATION RATE: 20 BRPM | SYSTOLIC BLOOD PRESSURE: 135 MMHG | DIASTOLIC BLOOD PRESSURE: 70 MMHG | OXYGEN SATURATION: 100 %

## 2021-11-29 DIAGNOSIS — M17.0 PRIMARY OSTEOARTHRITIS OF BOTH KNEES: ICD-10-CM

## 2021-11-29 PROCEDURE — 99283 EMERGENCY DEPT VISIT LOW MDM: CPT

## 2021-11-29 PROCEDURE — 250N000013 HC RX MED GY IP 250 OP 250 PS 637: Performed by: EMERGENCY MEDICINE

## 2021-11-29 RX ORDER — PREDNISONE 20 MG/1
TABLET ORAL
Qty: 9 TABLET | Refills: 0 | Status: SHIPPED | OUTPATIENT
Start: 2021-11-29 | End: 2021-12-06

## 2021-11-29 RX ORDER — OXYCODONE HYDROCHLORIDE 5 MG/1
5 TABLET ORAL ONCE
Status: COMPLETED | OUTPATIENT
Start: 2021-11-29 | End: 2021-11-29

## 2021-11-29 RX ORDER — OXYCODONE HYDROCHLORIDE 5 MG/1
5 TABLET ORAL EVERY 8 HOURS PRN
Qty: 10 TABLET | Refills: 0 | Status: SHIPPED | OUTPATIENT
Start: 2021-11-29 | End: 2023-06-19

## 2021-11-29 RX ADMIN — OXYCODONE HYDROCHLORIDE 5 MG: 5 TABLET ORAL at 05:55

## 2021-11-29 NOTE — ED PROVIDER NOTES
Visit Date: 11/29/2021    CHIEF COMPLAINT:  Bilateral knee pain.    HISTORY OF PRESENT ILLNESS:  This is an 87-year-old female with known osteoarthritis, as well as history of pseudogout, who presents with pain in her knees primarily.  She also has pain diffusely in other joints, including her hands, wrists and ankles.  This is chronic for her.  She has had no falls or trauma.  No fevers.  No warmth or redness.  She was seen for this knee pain approximately 6 months ago in the ER.  Ultrasound showed a Baker's cyst on the left knee, otherwise no acute findings.  She has been followed by Orthopedics and had a cortisone injection last January, which helped.  She takes tramadol 2-3 times a day as well as Tylenol 2-3 times a day, which typically helps her pain, but was not helping last evening.  She does note she missed a couple doses of her Tylenol yesterday.  No other complaints at this time.    PAST MEDICAL HISTORY:     1.  Type 2 diabetes mellitus.  2.  Pseudogout.  3.  Hypertension.  4.  Chronic kidney disease.  5.  Hyperlipidemia.  6.  Chronic back pain.  7.  Left bundle branch block.  8.  Osteoarthritis of the knees.    PAST SURGICAL HISTORY:     1.  Septoplasty.  2.  Hysterectomy.  3.  Hand surgery.    MEDICATIONS:     1.  Amlodipine.  2.  Aspirin.  3.  Baclofen.  4.  Docusate.  5.  Gabapentin.  6.  Oxybutynin.  7.  Valsartan.  8.  Tramadol.  9.  Tylenol.    ALLERGIES:  NONE.    SOCIAL HISTORY:  The patient presents with her son.  She does not smoke.    REVIEW OF SYSTEMS:  A pertinent 10-point review of systems is negative, except for that noted in the HPI.    PHYSICAL EXAMINATION:    GENERAL:  The patient is sitting up comfortably and appropriate with interaction.  VITAL SIGNS:  Blood pressure 135/70, heart rate 77, respiratory rate 20, oxygen 100% on room air, temperature 98.8 degrees.  HEENT:  Atraumatic.  CARDIOVASCULAR:  Regular rhythm, normal rate.  No murmurs.  RESPIRATORY:  Normal  effort.  GASTROINTESTINAL:  Soft, nondistended, nontender.  MUSCULOSKELETAL:  She has knee effusions noted bilaterally with the left being slightly more prominent than the right.  No warmth.  No erythema.  She has full passive and active range of motion of both knees.  No edema in the lower extremities.  SKIN:  No pertinent skin findings.  NEUROLOGIC:  The patient is alert and oriented x4.  Normal strength and sensation in the lower extremities.    LABORATORY EVALUATION AND DIAGNOSTIC STUDIES:  None.    EMERGENCY DEPARTMENT COURSE AND MEDICAL DECISION MAKING:  This is an 87-year-old female with known osteoarthritis, as well as pseudogout, who presents with bilateral knee effusions.  It has been present for some time.  She manages her pain with tramadol and Tylenol, which for the most part helps.  There has been no new falls or trauma, and she is ambulatory.  I do not feel that x-rays are indicated.  We did discuss arthrocentesis for synovial fluid analysis.  I have essentially no clinical suspicion of septic arthritis given the chronicity of her symptoms and lack of other findings, such as warmth or erythema.  Identifying pseudogout crystals or gout crystals would not  at this time as these have already been identified in the past per the patient's history.  I suspect osteoarthritis as the underlying cause.  Certainly a Baker's cyst could be contributory to some of the pain, but she shows no signs or symptoms concerning for DVT or need for ultrasound at this time.  We will start her on a 1-week prednisone taper, as well as a short course of oxycodone, for symptomatic relief at night with plan to follow up with [facility], who she has seen in the past for ongoing consultation and consideration of further therapies or cortisone injections.    DIAGNOSES:     1.  Chronic osteoarthritis of the bilateral knees.  2.  Bilateral knee effusions.    PLAN OF CARE:  As above.    Carlos June MD        D:  2021   T: 2021   MT: MARY GRACE    Name:     ZEE KILPATRICK  MRN:      [id number]        Account:    [id number]   :      1934           Visit Date: 2021     Document: [id number]

## 2021-11-29 NOTE — ED TRIAGE NOTES
Pt aox4, ABCs intact. Pt c/o generalized joint pain that is worse in her knees and now moving to her hips as well as numbness in her toes. Pt states that she normally has the pain but it was worse last night.

## 2023-06-19 ENCOUNTER — APPOINTMENT (OUTPATIENT)
Dept: GENERAL RADIOLOGY | Facility: CLINIC | Age: 88
End: 2023-06-19
Attending: EMERGENCY MEDICINE
Payer: COMMERCIAL

## 2023-06-19 ENCOUNTER — HOSPITAL ENCOUNTER (EMERGENCY)
Facility: CLINIC | Age: 88
Discharge: HOME | End: 2023-06-19
Attending: EMERGENCY MEDICINE | Admitting: EMERGENCY MEDICINE
Payer: COMMERCIAL

## 2023-06-19 VITALS
SYSTOLIC BLOOD PRESSURE: 190 MMHG | DIASTOLIC BLOOD PRESSURE: 77 MMHG | HEART RATE: 53 BPM | TEMPERATURE: 98.2 F | RESPIRATION RATE: 18 BRPM | OXYGEN SATURATION: 100 %

## 2023-06-19 DIAGNOSIS — J98.01 BRONCHOSPASM: ICD-10-CM

## 2023-06-19 DIAGNOSIS — J40 BRONCHITIS: ICD-10-CM

## 2023-06-19 DIAGNOSIS — L05.91 PILONIDAL CYST: ICD-10-CM

## 2023-06-19 PROCEDURE — 71046 X-RAY EXAM CHEST 2 VIEWS: CPT

## 2023-06-19 PROCEDURE — 250N000012 HC RX MED GY IP 250 OP 636 PS 637: Performed by: EMERGENCY MEDICINE

## 2023-06-19 PROCEDURE — 99283 EMERGENCY DEPT VISIT LOW MDM: CPT | Mod: 25

## 2023-06-19 RX ORDER — PREDNISONE 20 MG/1
TABLET ORAL
Qty: 8 TABLET | Refills: 0 | Status: SHIPPED | OUTPATIENT
Start: 2023-06-20

## 2023-06-19 RX ORDER — PREDNISONE 20 MG/1
40 TABLET ORAL ONCE
Status: COMPLETED | OUTPATIENT
Start: 2023-06-19 | End: 2023-06-19

## 2023-06-19 RX ADMIN — PREDNISONE 40 MG: 20 TABLET ORAL at 02:47

## 2023-06-19 NOTE — ED TRIAGE NOTES
Arrives from home with family. States cough and shortness of breath, states this started sometime last week.     Denies fevers. Has not utilized OTC medication for cough.     Denies HX of asthma.

## 2023-06-19 NOTE — ED PROVIDER NOTES
History     Chief Complaint:  Cough     HPI   Judit De Dios is a 89 year old female who has a history of DM type II, CKD, and presents with cough. The patient has had a cough with shortness of breath for the past couple weeks. She uses CPAP at home for MARITZA. She has been using her inhaler as well, with her trelegy. The shortness of breath has been worsening as well, and it is worse with sitting down and laying down. She denies asthma or allergies. She denies history of smoking or lung disease. She has been coughing up white/yellow phlegm. She denies chest pain or palpitations. She denies symptoms related to Acid reflux. She denies fevers, chills, or other cold symptoms. She has a lump on top of her buttcrack which irritates her when sitting down. She has this for months.    Independent Historian:    Son    Review of External Notes:  Care everywhere reviewed in epic updated    Medications:    Amlodipine  Aspirin  Baclofen  Docusate  Gabapentin  Oxybutynin  Valsartan   Januvia  Trelegy Ellipta    Past Medical History:    Hypertension  CKD  Hyperlipidemia  OA  DM type II   Pseudogout  LBBB  Acid reflux     Past Surgical History:    Hand surgery  Hysterectomy  Septoplasty     Physical Exam     Patient Vitals for the past 24 hrs:   BP Temp Temp src Pulse Resp SpO2   06/19/23 0248 -- -- -- -- -- 100 %   06/19/23 0246  190/77 -- -- 53 -- --   06/19/23 0057  196/77 98.2  F (36.8  C) Oral 62 18 93 %      Physical Exam  Nursing note and vitals reviewed.  Constitutional: Cooperative. Sitting up comfortably in bed  HENT:   Mouth/Throat: Mucous membranes are normal.   Cardiovascular: Lightly bradycardic rate, regular rhythm and normal heart sounds.  No murmur.  Pulmonary/Chest: Effort normal and breath sounds normal. No respiratory distress. No wheezes. No rales.   Abdominal: Soft. Normal appearance. There is no tenderness.   Musculoskeletal: No edema in lower extremities  Neurological: Alert. Oriented x3.  Strength  normal  Skin: Skin is warm and dry. No rash noted. She has a probable small pilonidal cyst at the superior aspect of the gluteal cleft. No erythema or fluctuance.   Psychiatric: Normal mood and affect.     Emergency Department Course   Imaging:  Chest XR,  PA & LAT   Final Result   IMPRESSION:       Right hemidiaphragm elevation.      No focal airspace disease. No pleural effusion or pneumothorax.      The cardiomediastinal silhouette is unremarkable.      Multilevel degenerative changes of the spine.      Report per radiology    Interventions:  Medications   predniSONE (DELTASONE) tablet 40 mg (has no administration in time range)      Assessments:  0125 I obtained history and examined patient.     Independent Interpretation (X-rays, CTs, rhythm strip):  I independently reviewed the chest x-ray above.  No infiltrate or pleural effusion.    Disposition:  The patient was discharged to home.     Impression & Plan      Medical Decision Making:  Judit De Dios is a 89 year old female who presents for evaluation of breathing difficulty. There is no hypoxia.  Symptoms likely due to bronchospasm.  No wheezing or hypoxia here.  Is unlikely represent cardiac cause such as heart failure.  Chest x-ray did not show pulmonary edema or pleural effusion.  No orthopnea described or lower extremity swelling.  Pulmonary embolism would be unlikely given this clinical description.  She would benefit from a steroid course with plan to continue using her home inhalers.  She did also asked me to look at a lesion at the top of her gluteal cleft that has been present for some months.  This is most likely representative of a small pilonidal cyst.  There is no fluctuance, erythema or drainage.  No indication for intervention at this time.  Advised her to follow-up with her regular physician    Diagnosis:    ICD-10-CM    1. Bronchitis  J40       2. Bronchospasm  J98.01       3. Pilonidal cyst  L05.91            Discharge Medications:  New  Prescriptions    PREDNISONE (DELTASONE) 20 MG TABLET    Take two tablets (= 40mg) each day for 4 (four) days      Scribe Disclosure:  IDebby Hired, am serving as a scribe at 1:18 AM on 6/19/2023 to document services personally performed by Carlos June MD based on my observations and the provider's statements to me.  6/19/2023   Carlos June MD Amdahl, John, MD  06/19/23 0504

## 2024-02-03 NOTE — ED NOTES
Pt states that her joint pain has been increasingly getting worse over the last week. All of her joints hurts, but her left knee is more painful than the others.  
No